# Patient Record
Sex: FEMALE | Race: BLACK OR AFRICAN AMERICAN | NOT HISPANIC OR LATINO | URBAN - METROPOLITAN AREA
[De-identification: names, ages, dates, MRNs, and addresses within clinical notes are randomized per-mention and may not be internally consistent; named-entity substitution may affect disease eponyms.]

---

## 2019-12-05 ENCOUNTER — EMERGENCY (EMERGENCY)
Facility: HOSPITAL | Age: 64
LOS: 1 days | Discharge: HOME | End: 2019-12-05
Admitting: EMERGENCY MEDICINE
Payer: MEDICARE

## 2019-12-05 VITALS
HEART RATE: 71 BPM | DIASTOLIC BLOOD PRESSURE: 90 MMHG | RESPIRATION RATE: 16 BRPM | SYSTOLIC BLOOD PRESSURE: 166 MMHG | OXYGEN SATURATION: 98 %

## 2019-12-05 VITALS
SYSTOLIC BLOOD PRESSURE: 175 MMHG | OXYGEN SATURATION: 98 % | HEIGHT: 67 IN | HEART RATE: 65 BPM | WEIGHT: 175.05 LBS | DIASTOLIC BLOOD PRESSURE: 110 MMHG | RESPIRATION RATE: 16 BRPM | TEMPERATURE: 98 F

## 2019-12-05 PROCEDURE — 99282 EMERGENCY DEPT VISIT SF MDM: CPT

## 2019-12-05 RX ORDER — IBUPROFEN 200 MG
400 TABLET ORAL ONCE
Refills: 0 | Status: COMPLETED | OUTPATIENT
Start: 2019-12-05 | End: 2019-12-05

## 2019-12-05 RX ADMIN — Medication 400 MILLIGRAM(S): at 16:03

## 2019-12-05 NOTE — ED ADULT TRIAGE NOTE - CHIEF COMPLAINT QUOTE
Pt presents to ED with c/o left arm pain after a mechanical fall last night, full ROM, no deformities

## 2019-12-05 NOTE — ED ADULT NURSE NOTE - NS PRO PASSIVE SMOKE EXP
Orthostatics done, pt became very dizzy when going from lying to sitting lasting over 5min's. Did not attempt to stand pt, Dr. Emil Crook aware. No

## 2019-12-05 NOTE — ED PROVIDER NOTE - CLINICAL SUMMARY MEDICAL DECISION MAKING FREE TEXT BOX
Supportive care at home, tylenol or ibuprofen prn, and Ortho F/U this week. Strict return precautions reviewed with pt in which pt verbalizes understanding and agrees to.

## 2019-12-05 NOTE — ED PROVIDER NOTE - NSFOLLOWUPINSTRUCTIONS_ED_ALL_ED_FT
LEFT FOREARM STRAIN    YOU MAY TAKE IBUPROFEN OR TYLENOL FOR THE PAIN AS NEEDED.    PLEASE FOLLOW UP WITH DR. MELENDEZ (ORTHOPEDIST) IN 4-6 DAYS IF YOU DO NOT EXPERIENCE IMPROVEMENT AS DISCUSSED.    RETURN TO THE ER IMMEDIATELY IF YOU DEVELOP SEVERE OR WORSENING PAIN, NUMBNESS, WEAKNESS, OR ANY OTHER CONCERNS.

## 2019-12-05 NOTE — ED PROVIDER NOTE - OBJECTIVE STATEMENT
65 y/o F presents to the ED c/o pain to left forearm after a mechanical trip and fall over a barrier at work yesterday, She reports falling forward onto her LUE and now has pain to her forearm with ceratin movement of her wrist and forearm. No pain at rest. The pain was worse last night but has slowly been improving today. She has not taken any medication for the pain prior to arrival. No other associated injuries. She states that her supervisor sent her here for medical clearance. No numbness, weakness or open wounds.

## 2019-12-05 NOTE — ED PROVIDER NOTE - PATIENT PORTAL LINK FT
You can access the FollowMyHealth Patient Portal offered by [facility] by registering at the following website: [email or web address]. By joining [facility]’s FollowMyHealth portal, you will also be able to view your health information using other applications (apps) compatible with our system.

## 2019-12-05 NOTE — ED PROVIDER NOTE - CARE PROVIDER_API CALL
Evaristo Norton)  Orthopaedic Surgery  [address] [place] [address]  Phone: [phone]  Fax: [phone]  Follow Up Time: 4-6 Days

## 2019-12-05 NOTE — ED ADULT NURSE NOTE - NSIMPLEMENTINTERV_GEN_ALL_ED
Implemented All Fall Risk Interventions:  [place] to call system. Call bell, personal items and telephone within reach. Instruct patient to call for assistance. Room bathroom lighting operational. Non-slip footwear when patient is off stretcher. Physically safe environment: no spills, clutter or unnecessary equipment. Stretcher in lowest position, wheels locked, appropriate side rails in place. Provide visual cue, wrist band, yellow gown, etc. Monitor gait and stability. Monitor for mental status changes and reorient to person, place, and time. Review medications for side effects contributing to fall risk. Reinforce activity limits and safety measures with patient and family.

## 2019-12-05 NOTE — ED PROVIDER NOTE - ADDITIONAL NOTES AND INSTRUCTIONS:
JORDYN MARROQUIN MAY RETURN TO WORK TODAY 12/5/2019 WITH LIMITED PHYSICAL ACTIVITY OF HER LEFT ARM FOR 1 WEEK (12/12/2019).

## 2019-12-05 NOTE — ED PROVIDER NOTE - MUSCULOSKELETAL, MLM
Pt endorses pain to her left forearm with supination, but her range of motion is not limited, no muscle or joint tenderness of LUE. No swelling, deformity or crepitus of LUE. NV status intact.
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
## 2019-12-10 DIAGNOSIS — M79.632 PAIN IN LEFT FOREARM: ICD-10-CM

## 2019-12-10 DIAGNOSIS — S56.912A STRAIN OF UNSPECIFIED MUSCLES, FASCIA AND TENDONS AT FOREARM LEVEL, LEFT ARM, INITIAL ENCOUNTER: ICD-10-CM
